# Patient Record
Sex: FEMALE | Race: WHITE | HISPANIC OR LATINO | Employment: OTHER | URBAN - METROPOLITAN AREA
[De-identification: names, ages, dates, MRNs, and addresses within clinical notes are randomized per-mention and may not be internally consistent; named-entity substitution may affect disease eponyms.]

---

## 2021-11-03 PROCEDURE — 99285 EMERGENCY DEPT VISIT HI MDM: CPT

## 2021-11-03 ASSESSMENT — PAIN DESCRIPTION - DESCRIPTORS
DESCRIPTORS: PRESSURE
DESCRIPTORS: PRESSURE

## 2021-11-03 ASSESSMENT — FIBROSIS 4 INDEX: FIB4 SCORE: 1.59

## 2021-11-04 ENCOUNTER — HOSPITAL ENCOUNTER (EMERGENCY)
Facility: MEDICAL CENTER | Age: 61
End: 2021-11-04
Attending: EMERGENCY MEDICINE

## 2021-11-04 ENCOUNTER — APPOINTMENT (OUTPATIENT)
Dept: RADIOLOGY | Facility: MEDICAL CENTER | Age: 61
End: 2021-11-04
Attending: EMERGENCY MEDICINE

## 2021-11-04 ENCOUNTER — PATIENT OUTREACH (OUTPATIENT)
Dept: HEALTH INFORMATION MANAGEMENT | Facility: OTHER | Age: 61
End: 2021-11-04

## 2021-11-04 VITALS
SYSTOLIC BLOOD PRESSURE: 145 MMHG | RESPIRATION RATE: 20 BRPM | OXYGEN SATURATION: 95 % | BODY MASS INDEX: 22.08 KG/M2 | TEMPERATURE: 99 F | WEIGHT: 120 LBS | HEART RATE: 89 BPM | DIASTOLIC BLOOD PRESSURE: 78 MMHG | HEIGHT: 62 IN

## 2021-11-04 DIAGNOSIS — F11.90 OPIOID USE DISORDER: ICD-10-CM

## 2021-11-04 DIAGNOSIS — R11.0 NAUSEA: ICD-10-CM

## 2021-11-04 DIAGNOSIS — M79.604 PAIN IN BOTH LOWER EXTREMITIES: ICD-10-CM

## 2021-11-04 DIAGNOSIS — M79.605 PAIN IN BOTH LOWER EXTREMITIES: ICD-10-CM

## 2021-11-04 DIAGNOSIS — R10.13 EPIGASTRIC PAIN: ICD-10-CM

## 2021-11-04 DIAGNOSIS — R93.5 ABNORMAL ABDOMINAL CT SCAN: ICD-10-CM

## 2021-11-04 LAB
ALBUMIN SERPL BCP-MCNC: 4.3 G/DL (ref 3.2–4.9)
ALBUMIN/GLOB SERPL: 0.8 G/DL
ALP SERPL-CCNC: 112 U/L (ref 30–99)
ALT SERPL-CCNC: 6 U/L (ref 2–50)
AMPHET UR QL SCN: POSITIVE
ANION GAP SERPL CALC-SCNC: 14 MMOL/L (ref 7–16)
APPEARANCE UR: ABNORMAL
AST SERPL-CCNC: 11 U/L (ref 12–45)
BACTERIA #/AREA URNS HPF: ABNORMAL /HPF
BARBITURATES UR QL SCN: NEGATIVE
BASOPHILS # BLD AUTO: 0 % (ref 0–1.8)
BASOPHILS # BLD: 0 K/UL (ref 0–0.12)
BENZODIAZ UR QL SCN: NEGATIVE
BILIRUB SERPL-MCNC: 0.4 MG/DL (ref 0.1–1.5)
BILIRUB UR QL STRIP.AUTO: NEGATIVE
BLOOD CULTURE HOLD CXBCH: NORMAL
BUN SERPL-MCNC: 12 MG/DL (ref 8–22)
BZE UR QL SCN: NEGATIVE
CALCIUM SERPL-MCNC: 9.6 MG/DL (ref 8.5–10.5)
CANNABINOIDS UR QL SCN: NEGATIVE
CHLORIDE SERPL-SCNC: 105 MMOL/L (ref 96–112)
CO2 SERPL-SCNC: 23 MMOL/L (ref 20–33)
COLOR UR: YELLOW
CREAT SERPL-MCNC: 0.63 MG/DL (ref 0.5–1.4)
EKG IMPRESSION: NORMAL
EOSINOPHIL # BLD AUTO: 0 K/UL (ref 0–0.51)
EOSINOPHIL NFR BLD: 0 % (ref 0–6.9)
EPI CELLS #/AREA URNS HPF: NEGATIVE /HPF
ERYTHROCYTE [DISTWIDTH] IN BLOOD BY AUTOMATED COUNT: 43.8 FL (ref 35.9–50)
GLOBULIN SER CALC-MCNC: 5.2 G/DL (ref 1.9–3.5)
GLUCOSE SERPL-MCNC: 138 MG/DL (ref 65–99)
GLUCOSE UR STRIP.AUTO-MCNC: NEGATIVE MG/DL
HCT VFR BLD AUTO: 40.8 % (ref 37–47)
HGB BLD-MCNC: 13.8 G/DL (ref 12–16)
HYALINE CASTS #/AREA URNS LPF: ABNORMAL /LPF
IMM GRANULOCYTES # BLD AUTO: 0.01 K/UL (ref 0–0.11)
IMM GRANULOCYTES NFR BLD AUTO: 0.2 % (ref 0–0.9)
KETONES UR STRIP.AUTO-MCNC: NEGATIVE MG/DL
LEUKOCYTE ESTERASE UR QL STRIP.AUTO: NEGATIVE
LIPASE SERPL-CCNC: 19 U/L (ref 11–82)
LYMPHOCYTES # BLD AUTO: 1.49 K/UL (ref 1–4.8)
LYMPHOCYTES NFR BLD: 33 % (ref 22–41)
MCH RBC QN AUTO: 32.8 PG (ref 27–33)
MCHC RBC AUTO-ENTMCNC: 33.8 G/DL (ref 33.6–35)
MCV RBC AUTO: 96.9 FL (ref 81.4–97.8)
METHADONE UR QL SCN: POSITIVE
MICRO URNS: ABNORMAL
MONOCYTES # BLD AUTO: 0.15 K/UL (ref 0–0.85)
MONOCYTES NFR BLD AUTO: 3.3 % (ref 0–13.4)
NEUTROPHILS # BLD AUTO: 2.86 K/UL (ref 2–7.15)
NEUTROPHILS NFR BLD: 63.5 % (ref 44–72)
NITRITE UR QL STRIP.AUTO: NEGATIVE
NRBC # BLD AUTO: 0 K/UL
NRBC BLD-RTO: 0 /100 WBC
OPIATES UR QL SCN: NEGATIVE
OXYCODONE UR QL SCN: NEGATIVE
PCP UR QL SCN: NEGATIVE
PH UR STRIP.AUTO: 7 [PH] (ref 5–8)
PLATELET # BLD AUTO: 175 K/UL (ref 164–446)
PMV BLD AUTO: 10.6 FL (ref 9–12.9)
POTASSIUM SERPL-SCNC: 3.9 MMOL/L (ref 3.6–5.5)
PROPOXYPH UR QL SCN: NEGATIVE
PROT SERPL-MCNC: 9.5 G/DL (ref 6–8.2)
PROT UR QL STRIP: 30 MG/DL
RBC # BLD AUTO: 4.21 M/UL (ref 4.2–5.4)
RBC # URNS HPF: ABNORMAL /HPF
RBC UR QL AUTO: ABNORMAL
SODIUM SERPL-SCNC: 142 MMOL/L (ref 135–145)
SP GR UR STRIP.AUTO: 1.02
TROPONIN T SERPL-MCNC: <6 NG/L (ref 6–19)
UROBILINOGEN UR STRIP.AUTO-MCNC: 0.2 MG/DL
WBC # BLD AUTO: 4.5 K/UL (ref 4.8–10.8)
WBC #/AREA URNS HPF: ABNORMAL /HPF

## 2021-11-04 PROCEDURE — 84484 ASSAY OF TROPONIN QUANT: CPT

## 2021-11-04 PROCEDURE — 96375 TX/PRO/DX INJ NEW DRUG ADDON: CPT

## 2021-11-04 PROCEDURE — 81001 URINALYSIS AUTO W/SCOPE: CPT

## 2021-11-04 PROCEDURE — 80307 DRUG TEST PRSMV CHEM ANLYZR: CPT

## 2021-11-04 PROCEDURE — 71045 X-RAY EXAM CHEST 1 VIEW: CPT

## 2021-11-04 PROCEDURE — 83690 ASSAY OF LIPASE: CPT

## 2021-11-04 PROCEDURE — 74176 CT ABD & PELVIS W/O CONTRAST: CPT

## 2021-11-04 PROCEDURE — 700101 HCHG RX REV CODE 250: Performed by: EMERGENCY MEDICINE

## 2021-11-04 PROCEDURE — 700102 HCHG RX REV CODE 250 W/ 637 OVERRIDE(OP): Performed by: EMERGENCY MEDICINE

## 2021-11-04 PROCEDURE — 87186 SC STD MICRODIL/AGAR DIL: CPT

## 2021-11-04 PROCEDURE — 700111 HCHG RX REV CODE 636 W/ 250 OVERRIDE (IP): Performed by: EMERGENCY MEDICINE

## 2021-11-04 PROCEDURE — 93005 ELECTROCARDIOGRAM TRACING: CPT | Performed by: EMERGENCY MEDICINE

## 2021-11-04 PROCEDURE — 96374 THER/PROPH/DIAG INJ IV PUSH: CPT

## 2021-11-04 PROCEDURE — 96372 THER/PROPH/DIAG INJ SC/IM: CPT

## 2021-11-04 PROCEDURE — 87086 URINE CULTURE/COLONY COUNT: CPT

## 2021-11-04 PROCEDURE — 85025 COMPLETE CBC W/AUTO DIFF WBC: CPT

## 2021-11-04 PROCEDURE — 80053 COMPREHEN METABOLIC PANEL: CPT

## 2021-11-04 PROCEDURE — 87077 CULTURE AEROBIC IDENTIFY: CPT

## 2021-11-04 PROCEDURE — A9270 NON-COVERED ITEM OR SERVICE: HCPCS | Performed by: EMERGENCY MEDICINE

## 2021-11-04 RX ORDER — BUPRENORPHINE HYDROCHLORIDE AND NALOXONE HYDROCHLORIDE DIHYDRATE 8; 2 MG/1; MG/1
1 TABLET SUBLINGUAL ONCE
Status: COMPLETED | OUTPATIENT
Start: 2021-11-04 | End: 2021-11-04

## 2021-11-04 RX ORDER — GABAPENTIN 100 MG/1
200 CAPSULE ORAL ONCE
Status: DISCONTINUED | OUTPATIENT
Start: 2021-11-04 | End: 2021-11-04 | Stop reason: HOSPADM

## 2021-11-04 RX ORDER — HALOPERIDOL 5 MG/ML
1 INJECTION INTRAMUSCULAR ONCE
Status: DISCONTINUED | OUTPATIENT
Start: 2021-11-04 | End: 2021-11-04 | Stop reason: HOSPADM

## 2021-11-04 RX ORDER — BUPRENORPHINE AND NALOXONE 4; 1 MG/1; MG/1
1 FILM, SOLUBLE BUCCAL; SUBLINGUAL ONCE
Status: COMPLETED | OUTPATIENT
Start: 2021-11-04 | End: 2021-11-04

## 2021-11-04 RX ORDER — ALUMINA, MAGNESIA, AND SIMETHICONE 2400; 2400; 240 MG/30ML; MG/30ML; MG/30ML
10 SUSPENSION ORAL ONCE
Status: COMPLETED | OUTPATIENT
Start: 2021-11-04 | End: 2021-11-04

## 2021-11-04 RX ORDER — KETOROLAC TROMETHAMINE 30 MG/ML
15 INJECTION, SOLUTION INTRAMUSCULAR; INTRAVENOUS ONCE
Status: COMPLETED | OUTPATIENT
Start: 2021-11-04 | End: 2021-11-04

## 2021-11-04 RX ORDER — ONDANSETRON 2 MG/ML
4 INJECTION INTRAMUSCULAR; INTRAVENOUS ONCE
Status: COMPLETED | OUTPATIENT
Start: 2021-11-04 | End: 2021-11-04

## 2021-11-04 RX ORDER — ACETAMINOPHEN 500 MG
1000 TABLET ORAL ONCE
Status: COMPLETED | OUTPATIENT
Start: 2021-11-04 | End: 2021-11-04

## 2021-11-04 RX ADMIN — KETOROLAC TROMETHAMINE 15 MG: 30 INJECTION, SOLUTION INTRAMUSCULAR at 01:28

## 2021-11-04 RX ADMIN — FAMOTIDINE 20 MG: 10 INJECTION INTRAVENOUS at 01:28

## 2021-11-04 RX ADMIN — ACETAMINOPHEN 1000 MG: 500 TABLET ORAL at 03:28

## 2021-11-04 RX ADMIN — ONDANSETRON 4 MG: 2 INJECTION INTRAMUSCULAR; INTRAVENOUS at 01:29

## 2021-11-04 RX ADMIN — BUPRENORPHINE HYDROCHLORIDE AND NALOXONE HYDROCHLORIDE DIHYDRATE 1 TABLET: 8; 2 TABLET SUBLINGUAL at 01:38

## 2021-11-04 RX ADMIN — ALUMINUM HYDROXIDE, MAGNESIUM HYDROXIDE, AND DIMETHICONE 10 ML: 400; 400; 40 SUSPENSION ORAL at 01:28

## 2021-11-04 RX ADMIN — KETAMINE HYDROCHLORIDE 25 MG: 10 INJECTION, SOLUTION INTRAMUSCULAR; INTRAVENOUS at 04:43

## 2021-11-04 RX ADMIN — BUPRENORPHINE AND NALOXONE 1 FILM: 4; 1 FILM BUCCAL; SUBLINGUAL at 03:55

## 2021-11-04 ASSESSMENT — PAIN DESCRIPTION - PAIN TYPE: TYPE: ACUTE PAIN

## 2021-11-04 NOTE — ED PROVIDER NOTES
"ED Provider Note    Scribed for Stew Rodriguez M.D. by Keila Cunningham. 11/4/2021,  12:45 AM.    Means of Arrival: EMS  History obtained from: patient   History limited by: None     CHIEF COMPLAINT  Chief Complaint   Patient presents with   • Epigastric Pain     brought in by [facility] c/o epigastric pain x 4 days   • Chest Pain     c/o chest pain described as pressure x 4 days . states \" it is hard to breathe \"    • Vomiting     x 2 days       HPI  Calista Barry is a 61 y.o. female who presents to the Emergency Department by EMS for evaluation of worsening epigastric abdominal pain onset 4 days ago. Patient reports that she had to call EMS today because her abdominal pain had become so severe that she \"couldn't breath.\" Patient was administered 4 mg of Zofran on route.  She adds that her abominal pain is non-radiating. Presents associated symptoms of weakness, dehydration, medial chest pain, subjective fevers, loss of appetite, emesis, and nausea . Denies diarrhea, hematemesis, hematuria, dysuria, cough. Patient has been using Aspirin for mild alleviation. Denies abdominal surgeries or ill contact. Patient adds that she has history of HIV and reports that her cell count has been well. She is compliant with her HIV medications. Patient adds that she has recently moved from [place] and is living in a hotel.  She states that she has not been able to get a prescription for Methadone in the last 10 days. She adds that she has been on Methadone for years and has been addicted to it. Patient believes her symptoms might be linked to withdrawal from Methadone. Patient reports that Suboxone does not work for her.     REVIEW OF SYSTEMS  CONSTITUTIONAL:  Subjective fever, weakness and dehydration  CARDIOVASCULAR:  Medial chest pain   RESPIRATORY:  Shortness of breath. No cough  GASTROINTESTINAL:  Epigastric pain, loss of appetite, emesis and nausea. No hematemesis  GENITOURINARY:   No dysuria, hematuria, diarrhea, " "  MUSCULOSKELETAL:  No arthralgia.  SKIN:  No rash or suspicious lesions.  NEUROLOGIC:   No headache.  See HPI for further details.   All other systems are negative.     PAST MEDICAL HISTORY  Past Medical History:   Diagnosis Date   • Heroin abuse (HCC)     last intake a year ago       FAMILY HISTORY  History reviewed. No pertinent family history.    SOCIAL HISTORY   reports that she has been smoking cigarettes. She has been smoking about 0.50 packs per day. She has never used smokeless tobacco. She reports previous drug use. She reports that she does not drink alcohol.    SURGICAL HISTORY  Past Surgical History:   Procedure Laterality Date   • OTHER      left arm surgery       CURRENT MEDICATIONS  No current outpatient medications     ALLERGIES  No Known Allergies    PHYSICAL EXAM  VITAL SIGNS: BP (!) 176/89   Pulse 86   Temp 36.9 °C (98.4 °F) (Oral)   Resp 18   Ht 1.575 m (5' 2\")   SpO2 96%    Gen: Alert, no acute distress  HEENT: ATNC  Eyes: PERRL, EOMI, normal conjunctiva.   Neck: trachea midline  Resp: no respiratory distress. Lungs clear with no wheezes or crackles.   CV: No JVD, regular rate and rhythm, no rubs or murmurs.   Abd: Soft with mild epigastric tenderness with rebound. non-distended  Ext: No deformities, moves all extremities  Psych: normal mood  Neuro: speech fluent, moves all extremities, no focal neurologic deficits. Sensation intact bilateral lower extremities.    DIAGNOSTIC STUDIES / PROCEDURES     EKG  Results for orders placed or performed during the hospital encounter of 21   EKG   Result Value Ref Range    Report       [facility]    Test Date:  2021  Pt Name:    GIOVANNI MATOS                   Department: ER  MRN:        [id number]                      Room:  Gender:     Female                       Technician: [id number]  :        1960                   Requested By:ER TRIAGE PROTOCOL  Order #:    [id number]                    Reading MD: " Stew Hill    Measurements  Intervals                                Axis  Rate:       95                           P:          54  DC:         154                          QRS:        8  QRSD:       84                           T:          -3  QT:         380  QTc:        478    Interpretive Statements  SINUS RHYTHM  No previous ECG available for comparison  Electronically Signed On 11-4-2021 2:47:34 PDT by Stew embraase          LABS  Labs Reviewed   CBC WITH DIFFERENTIAL - Abnormal; Notable for the following components:       Result Value    WBC 4.5 (*)     All other components within normal limits   COMP METABOLIC PANEL - Abnormal; Notable for the following components:    Glucose 138 (*)     AST(SGOT) 11 (*)     Alkaline Phosphatase 112 (*)     Total Protein 9.5 (*)     Globulin 5.2 (*)     All other components within normal limits   URINALYSIS - Abnormal; Notable for the following components:    Character Cloudy (*)     Protein 30 (*)     Occult Blood Trace (*)     All other components within normal limits   URINE MICROSCOPIC (W/UA) - Abnormal; Notable for the following components:    RBC 2-5 (*)     Bacteria Many (*)     All other components within normal limits   URINE DRUG SCREEN - Abnormal; Notable for the following components:    Amphetamines Urine Positive (*)     Methadone Positive (*)     All other components within normal limits   TROPONIN   LIPASE   ESTIMATED GFR   BLOOD CULTURE,HOLD   URINE CULTURE-EXISTING-LESS THAN 48 HOURS     All labs reviewed by me.    RADIOLOGY  CT-RENAL COLIC EVALUATION(A/P W/O)   Final Result         1.  Increased colonic stool may represent constipation.   2.  No urolithiasis or hydronephrosis.   3.  Nonspecific mild pelvic lymphadenopathy which is indeterminate. Malignancy cannot be excluded. Follow-up is suggested.   4.  Nonspecific areas of skin thickening most pronounced within the anterior abdominal wall and lower back/gluteal regions. Correlate with direct inspection.                DX-CHEST-PORTABLE (1 VIEW)   Final Result      No acute cardiopulmonary abnormality.        The radiologist’s interpretation of all radiology studies have been reviewed by me.    COURSE & MEDICAL DECISION MAKING  Pertinent Labs & Imaging studies reviewed. (See chart for details)    12:45 AM Patient seen and examined at bedside.  Patient will be treated with Zofran 4mg, Mylanta 10mL Toradol 15mg, and Suboxone 8-2 mg, and Pepcid 20mg for her symptoms. Discussed with patient about administering basic labs and radiology. Patient verbalizes understanding and agreement to this plan of care.      2:22 AM - Patient was reevaluated at bedside. Patient reports her epigastric pain has resolved. Repeat exam indicated non-tender abdomen. Patient is now complaining of back pain.    4:44 AM - Patient reevaluated at bedside. Patient reports her bilateral legs hurt. Denies any previous pain like this. Sensation intact in bilateral lower extremities. 5/5 strength to toe and an with dorsal flexion, plantar flexion, knee flexion, and extension of hip flexion. 2+ dorsalis pedal pulses bilaterally. No pitting edema. Patient will be treated with Tylenol 1000mg, Suboxone 4-1mg, Ketalar 25 mg    4:54 AM -patient offered gabapentin and haloperidol, however patient is refusing to take the medications as ordered, stating that those medications will not help her pain. We will discontinue ketamine for possible dysphoric reaction.    6:14 AM - Patient left room with no chaperone. She states that her legs are hurting and the reason for her pain is because she has not been on Methadone. Informed patient that labs and radiology indicate there no issue or concern for stronger controlled medications. A Urine test will be performed prior to discharge. Patient verbalizes that she wants to go home.  I discussed with patient care of plan following discharge, including resources for opioid addiction. Patient was given oppurtunity to ask  questions at this time. Patient verbalizes understanding and agrees to care of plan.  Patient will be discharged at this time. Patient will be discharged with a referral to PCP.        Medical Decision Making:  Patient presents with feeling unwell, vomiting, chest pain, epigastric pain.  She reports that she recently has stopped taking methadone.  She has a benign abdominal examination with some mild epigastric tenderness.  Labs are reassuring.  Repeat abdominal examination demonstrates no further abdominal tenderness.  Patient then changes to complaint of pain in her back and her legs.  She denies any recent IV drug use, low suspicion for spinal epidural abscess.  Her urinalysis does demonstrate some red blood cells, will perform CAT scan to evaluate for kidney stone.  Patient was interested in switching to Suboxone from her methadone was started here in the emergency department.  Patient tolerating oral intake.  No signs of MI.  No stigmata of DVT/PE.    The patient reports that she has not taken her methadone for 10 days, however symptoms did not improve with the Suboxone. This suggest she may have had some other opioids on board precipitating some form of withdrawal. Unfortunately, since the Suboxone has had greater affinity to the opioid receptors, there is no immediate reversal for this, must allow it to wear off. Through her course in emergency department, her complaints changed to leg pain, however she demonstrates no signs of cauda equina syndrome, ischemia, DVT. She is ambulating with steady gait with reassuring labs. CAT scan demonstrates no AAA, no renal stones, no other obvious intra-abdominal issues.    Patient then went to oped. I discussed with her the acute findings of her imaging. The patient states that all findings were described on her discharge summary, which she did receive in paper form and spoke with her. I placed a referral order to establish with a primary care physician as well as it  were to request follow-up from the emergency department regarding her symptoms. She is also given information on local opioid use disorder treatment options.    The patient's urinalysis does demonstrate some red blood cells and a few bacteria but no overt signs of infection. She has no urinary symptoms. This will be sent for culture. After she left, her urine drug screen returned positive for amphetamines, which may partially explain the bizarre nature of the overall visit.      The patient is referred to a primary physician for blood pressure management, diabetic screening, and for all other preventative health concerns.      DISPOSITION:  Patient will be discharged home in stable condition.    FOLLOW UP:  To establish a primary care provider within our system, please call [phone]          [facility]   [address] · First Floor   [place], [place] [address] [phone]        [facility]  [address] [id number] [phone]          FINAL IMPRESSION  1. Epigastric pain    2. Pain in both lower extremities    3. Nausea    4. Opioid use disorder    5. Abnormal abdominal CT scan            Keila PENA (Uz[place]), am scribing for, and in the presence of, Stew Rodriguez M.D..    Electronically signed by: Keila Cunningham ([place]air), 11/4/2021    IStew M.D. personally performed the services described in this documentation, as scribed by Keila Cunningham in my presence, and it is both accurate and complete.    The note accurately reflects work and decisions made by me.  Stew Rodriguez M.D.  11/4/2021  7:07 AM    C    This dictation was created using voice recognition software. The accuracy of the dictation is limited to the abilities of the software. I expect there may be some errors of grammar and possibly content. The nursing notes were reviewed and certain aspects of this information were incorporated  into this note.

## 2021-11-04 NOTE — PROGRESS NOTES
11/4/2021  CHW received incoming order from [facility]. Patient needs PCP. CHW called patient to follow up after ED visit. CHW left voice message with contact information for [facility]. CHW will call patient a 2nd time to schedule.        11/5/2021  Patient does not currently have insurance. [facility] offered [facility]. Patient accepted. CHW filled out online pre-registration with the patient. CHW provided patient with [facility] contact information. CHW will no longer follow as patient has no other needs.

## 2021-11-04 NOTE — ED TRIAGE NOTES
"Calista Barry  61 y.o.  female  Chief Complaint   Patient presents with   • Epigastric Pain     brought in by [facility] c/o epigastric pain x 4 days   • Chest Pain     c/o chest pain described as pressure x 4 days . states \" it is hard to breathe \"    • Vomiting     x 2 days     Patient was given 4 mg ODT zofran PTA  "

## 2021-11-04 NOTE — DISCHARGE INSTRUCTIONS
You're seen emergency part for abdominal and chest pain.  You're given medications which improve this pain but then you develop pain in your legs.  Your physical and neurologic exams are reassuring.    Your labs were reassuring.  The CAT scan does demonstrate some enlarged lymph nodes and these should be followed up.  You're being referred to a primary care physician.  Please contact the number to schedule an appointment.  You're also being given information for various places in the community for ongoing treatment of your opioid use disorder.    Please return to the emergency department or seek medical attention if you develop:  Fevers, difficulty breathing, blood in the urine, blood in the stool, any other new or concerning findings

## 2021-11-06 LAB
BACTERIA UR CULT: ABNORMAL
BACTERIA UR CULT: ABNORMAL
SIGNIFICANT IND 70042: ABNORMAL
SITE SITE: ABNORMAL
SOURCE SOURCE: ABNORMAL

## 2023-01-15 ENCOUNTER — HOSPITAL ENCOUNTER (EMERGENCY)
Facility: MEDICAL CENTER | Age: 63
End: 2023-01-15

## 2023-01-15 VITALS
TEMPERATURE: 97.8 F | HEIGHT: 61 IN | RESPIRATION RATE: 18 BRPM | WEIGHT: 120 LBS | OXYGEN SATURATION: 92 % | HEART RATE: 86 BPM | SYSTOLIC BLOOD PRESSURE: 118 MMHG | BODY MASS INDEX: 22.66 KG/M2 | DIASTOLIC BLOOD PRESSURE: 69 MMHG

## 2023-01-15 PROCEDURE — 302449 STATCHG TRIAGE ONLY (STATISTIC)

## 2023-01-15 ASSESSMENT — FIBROSIS 4 INDEX: FIB4 SCORE: 1.62
